# Patient Record
Sex: MALE | Employment: FULL TIME | URBAN - METROPOLITAN AREA
[De-identification: names, ages, dates, MRNs, and addresses within clinical notes are randomized per-mention and may not be internally consistent; named-entity substitution may affect disease eponyms.]

---

## 2018-11-19 ENCOUNTER — HOSPITAL ENCOUNTER (OUTPATIENT)
Dept: LAB | Age: 48
Discharge: HOME | End: 2018-11-19
Payer: SELF-PAY

## 2018-11-19 ENCOUNTER — OFFICE VISIT (OUTPATIENT)
Dept: HEMATOLOGY | Age: 48
End: 2018-11-19

## 2018-11-19 VITALS
TEMPERATURE: 98.5 F | HEART RATE: 78 BPM | SYSTOLIC BLOOD PRESSURE: 148 MMHG | DIASTOLIC BLOOD PRESSURE: 81 MMHG | BODY MASS INDEX: 28.96 KG/M2 | HEIGHT: 73 IN | WEIGHT: 218.5 LBS | OXYGEN SATURATION: 98 %

## 2018-11-19 DIAGNOSIS — B18.2 CHRONIC HEPATITIS C WITHOUT HEPATIC COMA (HCC): ICD-10-CM

## 2018-11-19 DIAGNOSIS — B18.2 CHRONIC HEPATITIS C WITHOUT HEPATIC COMA (HCC): Primary | ICD-10-CM

## 2018-11-19 PROBLEM — E11.9 TYPE II DIABETES MELLITUS (HCC): Status: ACTIVE | Noted: 2018-11-19

## 2018-11-19 PROBLEM — I10 HYPERTENSION: Status: ACTIVE | Noted: 2018-11-19

## 2018-11-19 LAB
ALBUMIN SERPL-MCNC: 4 G/DL (ref 3.4–5)
ALBUMIN/GLOB SERPL: 1.1 {RATIO} (ref 0.8–1.7)
ALP SERPL-CCNC: 79 U/L (ref 45–117)
ALT SERPL-CCNC: 88 U/L (ref 16–61)
ANION GAP SERPL CALC-SCNC: 9 MMOL/L (ref 3–18)
AST SERPL-CCNC: 28 U/L (ref 15–37)
BASOPHILS # BLD: 0 K/UL (ref 0–0.1)
BASOPHILS NFR BLD: 0 % (ref 0–2)
BILIRUB SERPL-MCNC: 0.5 MG/DL (ref 0.2–1)
BUN SERPL-MCNC: 13 MG/DL (ref 7–18)
BUN/CREAT SERPL: 13
CALCIUM SERPL-MCNC: 8.6 MG/DL (ref 8.5–10.1)
CHLORIDE SERPL-SCNC: 106 MMOL/L (ref 100–108)
CO2 SERPL-SCNC: 25 MMOL/L (ref 21–32)
CREAT SERPL-MCNC: 1.03 MG/DL (ref 0.6–1.3)
DIFFERENTIAL METHOD BLD: ABNORMAL
EOSINOPHIL # BLD: 0.3 K/UL (ref 0–0.4)
EOSINOPHIL NFR BLD: 3 % (ref 0–5)
ERYTHROCYTE [DISTWIDTH] IN BLOOD BY AUTOMATED COUNT: 13.2 % (ref 11.6–14.5)
GLOBULIN SER CALC-MCNC: 3.6 G/DL (ref 2–4)
GLUCOSE SERPL-MCNC: 93 MG/DL (ref 74–99)
HCT VFR BLD AUTO: 48 % (ref 36–48)
HGB BLD-MCNC: 16.4 G/DL (ref 13–16)
LYMPHOCYTES # BLD: 2.5 K/UL (ref 0.9–3.6)
LYMPHOCYTES NFR BLD: 25 % (ref 21–52)
MCH RBC QN AUTO: 31.6 PG (ref 24–34)
MCHC RBC AUTO-ENTMCNC: 34.2 G/DL (ref 31–37)
MCV RBC AUTO: 92.5 FL (ref 74–97)
MONOCYTES # BLD: 0.5 K/UL (ref 0.05–1.2)
MONOCYTES NFR BLD: 5 % (ref 3–10)
NEUTS SEG # BLD: 6.7 K/UL (ref 1.8–8)
NEUTS SEG NFR BLD: 67 % (ref 40–73)
PLATELET # BLD AUTO: 274 K/UL (ref 135–420)
PMV BLD AUTO: 11.2 FL (ref 9.2–11.8)
POTASSIUM SERPL-SCNC: 4.1 MMOL/L (ref 3.5–5.5)
PROT SERPL-MCNC: 7.6 G/DL (ref 6.4–8.2)
RBC # BLD AUTO: 5.19 M/UL (ref 4.7–5.5)
SODIUM SERPL-SCNC: 140 MMOL/L (ref 136–145)
WBC # BLD AUTO: 10 K/UL (ref 4.6–13.2)

## 2018-11-19 PROCEDURE — 86708 HEPATITIS A ANTIBODY: CPT

## 2018-11-19 PROCEDURE — 82107 ALPHA-FETOPROTEIN L3: CPT

## 2018-11-19 PROCEDURE — 87902 NFCT AGT GNTYP ALYS HEP C: CPT

## 2018-11-19 PROCEDURE — 87340 HEPATITIS B SURFACE AG IA: CPT

## 2018-11-19 PROCEDURE — 85025 COMPLETE CBC W/AUTO DIFF WBC: CPT

## 2018-11-19 PROCEDURE — 82728 ASSAY OF FERRITIN: CPT

## 2018-11-19 PROCEDURE — 86704 HEP B CORE ANTIBODY TOTAL: CPT

## 2018-11-19 PROCEDURE — 86706 HEP B SURFACE ANTIBODY: CPT

## 2018-11-19 PROCEDURE — 80053 COMPREHEN METABOLIC PANEL: CPT

## 2018-11-19 PROCEDURE — 87521 HEPATITIS C PROBE&RVRS TRNSC: CPT

## 2018-11-19 PROCEDURE — 36415 COLL VENOUS BLD VENIPUNCTURE: CPT

## 2018-11-19 PROCEDURE — 83540 ASSAY OF IRON: CPT

## 2018-11-19 RX ORDER — LISINOPRIL 10 MG/1
TABLET ORAL DAILY
COMMUNITY
End: 2019-01-28 | Stop reason: SDUPTHER

## 2018-11-19 RX ORDER — GLIPIZIDE 5 MG/1
TABLET ORAL 2 TIMES DAILY
COMMUNITY

## 2018-11-19 RX ORDER — METFORMIN HYDROCHLORIDE 500 MG/1
TABLET ORAL 2 TIMES DAILY WITH MEALS
COMMUNITY

## 2018-11-19 NOTE — PROGRESS NOTES
[address] [facility] Ashley Tex Chawla MD, Casi Piper, [facility] LUIS ALBERTO Chaparro PA-C Jeananne Sanders, [facility]   Zoraida Schulte, LUIS ALBERTO Mello, LUIS ALBERTO [facility] 136 
  at [address] 
  [address] [place] [address], [facility] Jessie Aguilera  22. 
  [phone] FAX: [address] 
  [facility] 
  [address], [address] 98 [facility] A[place], [address] 
  [phone] FAX: [phone] Patient Care Team: 
Christian Alexander MD as PCP - [facility]) Problem List  Date Reviewed: 11/19/2018 Codes Class Noted Chronic hepatitis C ([facility].) ICD-10-CM: B18.2 ICD-9-CM: 070.54  11/19/2018 Type II diabetes mellitus ([facility] ICD-10-CM: E11.9 ICD-9-CM: 250.00  11/19/2018 Hypertension ICD-10-CM: I10 
ICD-9-CM: 401.9  11/19/2018 The clinicians listed above have asked me to see Dejah Graham in consultation regarding chronic HCV and its management. All medical records sent by the referring physicians were reviewed The patient is a 50 y.o.  male who was screened for anti-HCV and tested positive after enrolling in drug treatment in 3/2018. Risk factors for acquiring HCV are IV drug use in 1988 - 2016. There was no history of acute icteric hepatitis at the time of these risk factors. Imaging of the liver was not performed. An assessment of liver fibrosis with biopsy or elastography has not been performed. The patient has never received treatment for chronic HCV. The patient notes pain in the right side over the liver, The patient has not experienced fatigue, The patient completes all daily activities without any functional limitations.  
 
 
 
ASSESSMENT AND PLAN: 
Chronic HCV  
 Chronic HCV of unclear severity. AST is normal.  ALT is elevated. ALP is normal.  Liver function is normal.  The platelet count is normal.   
 
Have performed laboratory testing to monitor liver function and degree of liver injury. This included BMP, hepatic panel, CBC with platelet count, Will perform and/or review results of HCV viral load, HCV genotype to define the specific treatment and duration of treatment that will be required. Will perform  serologic and virologic studies to assess for other causes of chronic liver disease. Will perform imaging of the liver with ultrasound. The need to assess liver fibrosis was discussed. Sheer wave elastography can assess liver fibrosis and determine if a patient has advanced fibrosis or cirrhosis without the need for liver biopsy. Sheer wave elast[facility] is [facility] available at [facility]. This will be scheduled. The patient has not been treated for HCV. The patient has HCV genotype 1A. Discussed the treatment alternatives. The SVR/cure rate for HCV now exceeds 97% without significant side effects for most patients with HCV. The specific treatment is dependent upon genotype, viral load and histology. The patient should be treated with Harvoni (sofasbuvir and ledipasvir), Mavyret (glecaprevir and piprentasvir) or Epclusa (sofosbuvir and valpitasvir). The SVR/cure rate for is over 95%. Screening for Hepatocellular Carcinoma [address] screening is not necessary if the patient has no evidence of cirrhosis. Treatment of other medical problems in patients with chronic liver disease There are no contraindications for the patient to take any medications that are necessary for treatment of other medical issues. The patient does not consume alcohol daily. Normal doses of acetaminophen can be used for pain as needed. Normal doses of acetaminophen as recommended on the label are not hepatotoxic, even in patients with cirrhosis. Counseling for alcohol in patients with chronic liver disease The patient was counseled regarding alcohol consumption and the effect of alcohol on chronic liver disease. The patient has not consumed alcohol since 2016. Vaccinations Vaccination for viral hepatitis A and B is recommended since the patient has no serologic evidence of previous exposure or vaccination with immunity. Routine vaccinations against other bacterial and viral agents can be performed as indicated. Annual flu vaccination should be administered if indicated. ALLERGIES Not on File MEDICATIONS Current Outpatient Medications Medication Sig  
 metFORMIN (GLUCOPHAGE) 500 mg tablet Take  by mouth two (2) times daily (with meals).  glipiZIDE (GLUCOTROL) 5 mg tablet Take  by mouth two (2) times a day.  lisinopril (PRINIVIL, ZESTRIL) 10 mg tablet Take  by mouth daily. No current facility-administered medications for this visit. SYSTEM REVIEW NOT RELATED TO LIVER DISEASE OR REVIEWED ABOVE: 
Constitution systems: Negative for fever, chills, weight gain, weight loss. Eyes: Negative for visual changes. ENT: Negative for sore throat, painful swallowing. Respiratory: Negative for cough, hemoptysis, SOB. Cardiology: Negative for chest pain, palpitations. GI:  Negative for constipation or diarrhea. : Negative for urinary frequency, dysuria, hematuria, nocturia. Skin: Negative for rash. Hematology: Negative for easy bruising, blood clots. Musculo-skelatal: Negative for back pain, muscle pain, weakness. Neurologic: Negative for headaches, dizziness, vertigo, memory problems not related to HE. Psychology: Negative for anxiety, depression. FAMILY HISTORY: 
The father is alive and healthy. The mother is alive and healthy. There is no family history of liver disease. SOCIAL HISTORY: 
The patient is .    
The patient has 2 children,  
 The patient currently smokes 1 pack of tobacco daily. The patient has previously consumed alcohol socially never in excess. The patient has been abstinent from alcohol since 2016. The patient currently works part time as a dockhand. PHYSICAL EXAMINATION: 
Visit Vitals /81 Pulse 78 Temp 98.5 °F (36.9 °C) (Tympanic) Ht 6' 1\" (1.854 m) Wt 218 lb 8 oz (99.1 kg) SpO2 98% BMI 28.83 kg/m² General: No acute distress. Eyes: Sclera anicteric. ENT: No oral lesions. Thyroid normal. 
Nodes: No adenopathy. Skin: No spider angiomata. No jaundice. No palmar erythema. Respiratory: Lungs clear to auscultation. Cardiovascular: Regular heart rate. No murmurs. No JVD. Abdomen: Soft non-tender. Liver size normal to percussion/palpation. Spleen not palpable. No obvious ascites. Extremities: No edema. No muscle wasting. No gross arthritic changes. Neurologic: Alert and oriented. Cranial nerves grossly intact. No asterixis. LABORATORY STUDIES: 
Liver [place] of [address] & Units 11/19/2018 WBC 4.6 - 13.2 K/uL 10.0 ANC 1.8 - 8.0 K/UL 6.7 HGB 13.0 - 16.0 g/dL 16.4 (H)  - 420 K/uL 274 AST 15 - 37 U/L 28 ALT 16 - 61 U/L 88 (H) Alk Phos 45 - 117 U/L 79 Bili, Total 0.2 - 1.0 MG/DL 0.5 Albumin 3.4 - 5.0 g/dL 4.0  
BUN 7.0 - 18 MG/DL 13 Creat 0.6 - 1.3 MG/DL 1.03 Na 136 - 145 mmol/L 140  
K 3.5 - 5.5 mmol/L 4.1 Cl 100 - 108 mmol/L 106 CO2 21 - 32 mmol/L 25 Glucose 74 - 99 mg/dL 93 SEROLOGIES: 
Serologies Latest Ref Rng & Units 11/19/2018 Hep A Ab, Total NEGATIVE   NEGATIVE Hep B Surface Ag <1.00 Index <0.10 Hep B Surface Ag Interp NEG   NEGATIVE Hep B Core Ab, Total NEGATIVE   NEGATIVE Hep B Surface Ab >10.0 mIU/mL <3.10 (L) Hep B Surface Ab Interp POS   NEGATIVE (A) Hep C Genotype  1a Ferritin 8 - 388 NG/ML 87 Iron % Saturation % 39 LIVER HISTOLOGY: 
Not available or performed ENDOSCOPIC PROCEDURES: 
 Not available or performed RADIOLOGY: 
Not available or performed OTHER TESTING: 
Not available or performed FOLLOW-UP: 
All of the issues listed above in the Assessment and Plan were discussed with the patient. All questions were answered. The patient expressed a clear understanding of the above. [address] [place] [address] in 4 weeks for elastography and to initiate HCV treatment. Yadi Valle MD 
[address] [address], [address] [place], [address] 
[phone] [address] [place] [address]

## 2018-11-20 LAB
AFP L3 MFR SERPL: 5.8 % (ref 0–9.9)
AFP SERPL-MCNC: 5.3 NG/ML (ref 0–8)
FERRITIN SERPL-MCNC: 87 NG/ML (ref 8–388)
HAV AB SER QL IA: NEGATIVE
HBV CORE AB SERPL QL IA: NEGATIVE
HBV SURFACE AB SER QL IA: NEGATIVE
HBV SURFACE AB SERPL IA-ACNC: <3.1 MIU/ML
HBV SURFACE AG SER QL: <0.1 INDEX
HBV SURFACE AG SER QL: NEGATIVE
HEP BS AB COMMENT,HBSAC: ABNORMAL
IRON SATN MFR SERPL: 39 %
IRON SERPL-MCNC: 123 UG/DL (ref 50–175)
TIBC SERPL-MCNC: 313 UG/DL (ref 250–450)

## 2018-11-23 LAB
HCV GENTYP SERPL NAA+PROBE: NORMAL
HCV RNA SERPL NAA+PROBE-LOG IU: 7.23 HCV LOG 10IU/ML
HCV RNA SERPL PROBE AMP-ACNC: ABNORMAL HCVIU/ML
HCV RNA SERPL QL NAA+PROBE: POSITIVE
PLEASE NOTE, 550474: NORMAL

## 2018-12-05 ENCOUNTER — HOSPITAL ENCOUNTER (OUTPATIENT)
Dept: ULTRASOUND IMAGING | Age: 48
Discharge: HOME | End: 2018-12-05
Attending: INTERNAL MEDICINE
Payer: SELF-PAY

## 2018-12-05 DIAGNOSIS — B18.2 CHRONIC HEPATITIS C WITHOUT HEPATIC COMA (HCC): ICD-10-CM

## 2018-12-05 PROCEDURE — 0346T US ABD LTD W ELASTOGRAPHY: CPT

## 2019-01-28 ENCOUNTER — HOSPITAL ENCOUNTER (OUTPATIENT)
Dept: LAB | Age: 49
Discharge: HOME | End: 2019-01-28

## 2019-01-28 ENCOUNTER — OFFICE VISIT (OUTPATIENT)
Dept: HEMATOLOGY | Age: 49
End: 2019-01-28

## 2019-01-28 VITALS
RESPIRATION RATE: 17 BRPM | TEMPERATURE: 98.5 F | OXYGEN SATURATION: 98 % | SYSTOLIC BLOOD PRESSURE: 142 MMHG | BODY MASS INDEX: 29.55 KG/M2 | HEART RATE: 84 BPM | DIASTOLIC BLOOD PRESSURE: 83 MMHG | WEIGHT: 223 LBS | HEIGHT: 73 IN

## 2019-01-28 DIAGNOSIS — B18.2 CHRONIC HEPATITIS C WITHOUT HEPATIC COMA (HCC): Primary | ICD-10-CM

## 2019-01-28 LAB — XX-LABCORP SPECIMEN COL,LCBCF: NORMAL

## 2019-01-28 PROCEDURE — 99001 SPECIMEN HANDLING PT-LAB: CPT

## 2019-01-28 RX ORDER — LISINOPRIL 10 MG/1
10 TABLET ORAL DAILY
Qty: 30 TAB | Refills: 0 | Status: SHIPPED | OUTPATIENT
Start: 2019-01-28

## 2019-01-28 NOTE — PROGRESS NOTES
1. Have you been to the ER, urgent care clinic since your last visit? Hospitalized since your last visit? No    2. Have you seen or consulted any other health care providers outside of the [address] [place] [address] since your last visit? Include any pap smears or colon screening.  No

## 2019-01-28 NOTE — PROGRESS NOTES
[address], [place], [address], [place], [place]       Jordan Callahan, LUIS ALBERTO Patino, OFELIA Clark, [facility]   LUIS ALBERTO Dillard NP [address] [facility] 136    at [address], [address] Jessie Aguilera  22.    [phone]    FAX: [address] [place] [address] Drive, [address] [facility], [address]    [phone]    FAX: [phone]       Patient Care Team:  Mila Staley MD as PCP - General (Family Practice)      Problem List  Date Reviewed: 11/19/2018          Codes Class Noted    Chronic hepatitis C ([facility] ICD-10-CM: B18.2  ICD-9-CM: 070.54  11/19/2018        Type II diabetes mellitus ([facility] ICD-10-CM: E11.9  ICD-9-CM: 250.00  11/19/2018        Hypertension ICD-10-CM: I10  ICD-9-CM: 401.9  11/19/2018              Inocencio Vuong returns to the [address] for management of chronic HCV. The active problem list, all pertinent past medical history, medications, liver histology, radiologic findings, and laboratory findings related to the liver disorder were reviewed with the patient. The patient is a 50 y.o.  male who was screened for anti-HCV and tested positive after enrolling in drug treatment in 3/2018. Risk factors for acquiring HCV are IV drug use in 1988 - 2016. There was no history of acute icteric hepatitis at the time of these risk factors. The most recent imaging of the liver was Ultrasound performed in 12/2018. Results suggest chronic liver disease. No liver mass lesions noted. Assessment of liver fibrosis was performed with sheer wave elastography at THE [facility] in 12/2018. The result was E Mean: 6.10 kPa which correlates with portal fibrosis.     The patient has never received treatment for chronic HCV.      The patient notes pain in the right side over the liver. The patient has not experienced fatigue. The patient completes all daily activities without any functional limitations. ASSESSMENT AND PLAN:  Chronic HCV   Chronic HCV with portal fibrosis. AST is normal.  ALT is elevated. ALP is normal.  Liver function is normal.  The platelet count is normal.      Have performed laboratory testing to monitor liver function and degree of liver injury. This included BMP, hepatic panel, CBC with platelet count,   Will perform and/or review results of HCV viral load, HCV genotype to define the specific treatment and duration of treatment that will be required. Serologic and virologic studies to assess for other causes of chronic liver disease were negative. Have performed imaging of the liver with ultrasound. The need to assess liver fibrosis was discussed. Sheer wave elastography can assess liver fibrosis and determine if a patient has advanced fibrosis or cirrhosis without the need for liver biopsy. Sheer wave elastography is now available at [facility]. The patient has not been treated for HCV. The patient has HCV genotype 1A. Discussed the treatment alternatives. The SVR/cure rate for HCV now exceeds 97% without significant side effects for most patients with HCV. The specific treatment is dependent upon genotype, viral load and histology. The patient should be treated with Harvoni (sofasbuvir and ledipasvir), Mavyret (glecaprevir and piprentasvir) or Epclusa (sofosbuvir and valpitasvir). The SVR/cure rate for is over 95%. We will request pre-authorization for the HCV medication subject to the approval guidelines of the patient's insurance carrier. If the patient is denied we may appeal on their behalf. I have explained to him that I will order the medications from the specialty pharmacy and they will be delivered to his home.   He may begin taking the treatment medications as soon as they arrive. He is to call and make an appointment for treatment week #4 once he begins therapy. He voiced understanding of this plan. Screening for Hepatocellular Carcinoma  HCC screening is not necessary if the patient has no evidence of cirrhosis. Treatment of other medical problems in patients with chronic liver disease  There are no contraindications for the patient to take any medications that are necessary for treatment of other medical issues. The patient does not consume alcohol daily. Normal doses of acetaminophen can be used for pain as needed. Normal doses of acetaminophen as recommended on the label are not hepatotoxic, even in patients with cirrhosis. Hypertension  The patient states he has been unable to refill his medication through [facility]. Medication reordered with no refills. Instructed patient to follow up with his PCP. Counseling for alcohol in patients with chronic liver disease  The patient was counseled regarding alcohol consumption and the effect of alcohol on chronic liver disease. The patient has not consumed alcohol since 2016. Vaccinations   Vaccination for viral hepatitis A and B is recommended since the patient has no serologic evidence of previous exposure or vaccination with immunity. Routine vaccinations against other bacterial and viral agents can be performed as indicated. Annual flu vaccination should be administered if indicated. ALLERGIES  Not on File    MEDICATIONS  Current Outpatient Medications   Medication Sig    lisinopril (PRINIVIL, ZESTRIL) 10 mg tablet Take 1 Tab by mouth daily.  metFORMIN (GLUCOPHAGE) 500 mg tablet Take  by mouth two (2) times daily (with meals).  glipiZIDE (GLUCOTROL) 5 mg tablet Take  by mouth two (2) times a day. No current facility-administered medications for this visit.         SYSTEM REVIEW NOT RELATED TO LIVER DISEASE OR REVIEWED ABOVE:  Constitution systems: Negative for fever, chills, weight gain, weight loss. Eyes: Negative for visual changes. ENT: Negative for sore throat, painful swallowing. Respiratory: Negative for cough, hemoptysis, SOB. Cardiology: Negative for chest pain, palpitations. GI:  Negative for constipation or diarrhea. : Negative for urinary frequency, dysuria, hematuria, nocturia. Skin: Negative for rash. Hematology: Negative for easy bruising, blood clots. Musculo-skelatal: Negative for back pain, muscle pain, weakness. Neurologic: Negative for headaches, dizziness, vertigo, memory problems not related to HE. Psychology: Negative for anxiety, depression. FAMILY HISTORY:  The father is alive and healthy. The mother is alive and healthy. There is no family history of liver disease. SOCIAL HISTORY:  The patient is . The patient has 2 children,   The patient currently smokes 1 pack of tobacco daily. The patient has previously consumed alcohol socially never in excess. The patient has been abstinent from alcohol since 2016. The patient currently works part time as a dockhand. PHYSICAL EXAMINATION:  Visit Vitals  /83 (BP 1 Location: Right arm, BP Patient Position: Sitting)   Pulse 84   Temp 98.5 °F (36.9 °C)   Resp 17   Ht 6' 1\" (1.854 m)   Wt 223 lb (101.2 kg)   SpO2 98%   BMI 29.42 kg/m²     General: No acute distress. Eyes: Sclera anicteric. ENT: No oral lesions. Thyroid normal.  Nodes: No adenopathy. Skin: No spider angiomata. No jaundice. No palmar erythema. Respiratory: Lungs clear to auscultation. Cardiovascular: Regular heart rate. No murmurs. No JVD. Abdomen: Soft non-tender. Liver size normal to percussion/palpation. Spleen not palpable. No obvious ascites. Extremities: No edema. No muscle wasting. No gross arthritic changes. Neurologic: Alert and oriented. Cranial nerves grossly intact. No asterixis.     LABORATORY STUDIES:  Liver [place] of [address] [place] [address] & [facility] 11/19/2018   WBC 4.6 - 13.2 K/uL 10.0   ANC 1.8 - 8.0 K/UL 6.7   HGB 13.0 - 16.0 g/dL 16.4 (H)    - 420 K/uL 274   AST 15 - 37 U/L 28   ALT 16 - 61 U/L 88 (H)   Alk Phos 45 - 117 U/L 79   Bili, Total 0.2 - 1.0 MG/DL 0.5   Albumin 3.4 - 5.0 g/dL 4.0   BUN 7.0 - 18 MG/DL 13   Creat 0.6 - 1.3 MG/DL 1.03   Na 136 - 145 mmol/L 140   K 3.5 - 5.5 mmol/L 4.1   Cl 100 - 108 mmol/L 106   CO2 21 - 32 mmol/L 25   Glucose 74 - 99 mg/dL 93     SEROLOGIES:  Serologies Latest Ref Rng & Units 11/19/2018   Hep A Ab, Total NEGATIVE   NEGATIVE   Hep B Surface Ag <1.00 Index <0.10   Hep B Surface Ag Interp NEG   NEGATIVE   Hep B Core Ab, Total NEGATIVE   NEGATIVE   Hep B Surface Ab >10.0 mIU/mL <3.10 (L)   Hep B Surface Ab Interp POS   NEGATIVE (A)   Hep C Genotype  1a   Ferritin 8 - 388 NG/ML 87   Iron % Saturation % 39     11/2018. HCV RNA 98,926,038 IU/mL    LIVER HISTOLOGY:  12/2018. Sheer wave elastography performed at THE [facility]. EkPa was E Range: 4.60-8.63 kPa, E Mean: 6.10 kPa, E Median: 6.15 kPa, E Std: 1.19 kPa. The results suggested a fibrosis level of F1.    ENDOSCOPIC PROCEDURES:  Not available or performed    RADIOLOGY:  12/2018. Ultrasound of liver. Echogenic consistent with chronic liver disease. No liver mass lesions. No dilated bile ducts. No ascites. OTHER TESTING:  Not available or performed      FOLLOW-UP:  All of the issues listed above in the Assessment and Plan were discussed with the patient. All questions were answered. The patient expressed a clear understanding of the above.     Return to [place] [address] for monitoring of HCV treatment in 4 weeks after starting medication      Karolina Chacon, GRICELDA-BC  [address] of [place] [address]., [address]  [phone]

## 2019-02-04 LAB
ALBUMIN SERPL-MCNC: 4.3 G/DL (ref 3.5–5.5)
ALP SERPL-CCNC: 73 IU/L (ref 39–117)
ALT SERPL-CCNC: 74 IU/L (ref 0–44)
AST SERPL-CCNC: 33 IU/L (ref 0–40)
BASOPHILS # BLD AUTO: 0 X10E3/UL (ref 0–0.2)
BASOPHILS NFR BLD AUTO: 0 %
BILIRUB DIRECT SERPL-MCNC: 0.13 MG/DL (ref 0–0.4)
BILIRUB SERPL-MCNC: 0.4 MG/DL (ref 0–1.2)
BUN SERPL-MCNC: 10 MG/DL (ref 6–24)
BUN/CREAT SERPL: 11 (ref 9–20)
CALCIUM SERPL-MCNC: 9.5 MG/DL (ref 8.7–10.2)
CHLORIDE SERPL-SCNC: 100 MMOL/L (ref 96–106)
CO2 SERPL-SCNC: 22 MMOL/L (ref 20–29)
CREAT SERPL-MCNC: 0.87 MG/DL (ref 0.76–1.27)
EOSINOPHIL # BLD AUTO: 0.4 X10E3/UL (ref 0–0.4)
EOSINOPHIL NFR BLD AUTO: 3 %
ERYTHROCYTE [DISTWIDTH] IN BLOOD BY AUTOMATED COUNT: 13.4 % (ref 12.3–15.4)
GLUCOSE SERPL-MCNC: 94 MG/DL (ref 65–99)
HCT VFR BLD AUTO: 47.8 % (ref 37.5–51)
HCV RNA SERPL NAA+PROBE-ACNC: NORMAL IU/ML
HCV RNA SERPL NAA+PROBE-LOG IU: 6.59 LOG10 IU/ML
HCV RNA SERPL QL NAA+PROBE: POSITIVE
HGB BLD-MCNC: 16.7 G/DL (ref 13–17.7)
IMM GRANULOCYTES # BLD AUTO: 0 X10E3/UL (ref 0–0.1)
IMM GRANULOCYTES NFR BLD AUTO: 0 %
LYMPHOCYTES # BLD AUTO: 2.6 X10E3/UL (ref 0.7–3.1)
LYMPHOCYTES NFR BLD AUTO: 23 %
MCH RBC QN AUTO: 32.2 PG (ref 26.6–33)
MCHC RBC AUTO-ENTMCNC: 34.9 G/DL (ref 31.5–35.7)
MCV RBC AUTO: 92 FL (ref 79–97)
MONOCYTES # BLD AUTO: 0.7 X10E3/UL (ref 0.1–0.9)
MONOCYTES NFR BLD AUTO: 7 %
NEUTROPHILS # BLD AUTO: 7.4 X10E3/UL (ref 1.4–7)
NEUTROPHILS NFR BLD AUTO: 67 %
PLATELET # BLD AUTO: 314 X10E3/UL (ref 150–379)
POTASSIUM SERPL-SCNC: 4.2 MMOL/L (ref 3.5–5.2)
PROT SERPL-MCNC: 7.4 G/DL (ref 6–8.5)
RBC # BLD AUTO: 5.19 X10E6/UL (ref 4.14–5.8)
SODIUM SERPL-SCNC: 139 MMOL/L (ref 134–144)
TEST INFORMATION: NORMAL
WBC # BLD AUTO: 11.1 X10E3/UL (ref 3.4–10.8)

## 2019-02-07 ENCOUNTER — TELEPHONE (OUTPATIENT)
Dept: HEMATOLOGY | Age: 49
End: 2019-02-07

## 2019-02-07 NOTE — TELEPHONE ENCOUNTER
Has pt received Mavyret yet? If so when did he start & is 4wk appt made?  If not please remind to call in once Mavyret has been received

## 2019-04-16 RX ORDER — LISINOPRIL 10 MG/1
10 TABLET ORAL DAILY
Qty: 30 TAB | Refills: 0 | OUTPATIENT
Start: 2019-04-16